# Patient Record
Sex: MALE | Race: WHITE | NOT HISPANIC OR LATINO | Employment: FULL TIME | URBAN - METROPOLITAN AREA
[De-identification: names, ages, dates, MRNs, and addresses within clinical notes are randomized per-mention and may not be internally consistent; named-entity substitution may affect disease eponyms.]

---

## 2024-02-16 ENCOUNTER — OFFICE VISIT (OUTPATIENT)
Dept: URGENT CARE | Age: 39
End: 2024-02-16
Payer: COMMERCIAL

## 2024-02-16 VITALS
RESPIRATION RATE: 18 BRPM | SYSTOLIC BLOOD PRESSURE: 182 MMHG | OXYGEN SATURATION: 98 % | TEMPERATURE: 98.5 F | DIASTOLIC BLOOD PRESSURE: 104 MMHG | HEART RATE: 98 BPM

## 2024-02-16 DIAGNOSIS — J02.9 SORE THROAT: Primary | ICD-10-CM

## 2024-02-16 LAB — S PYO AG THROAT QL: NEGATIVE

## 2024-02-16 PROCEDURE — 87070 CULTURE OTHR SPECIMN AEROBIC: CPT

## 2024-02-16 PROCEDURE — 87880 STREP A ASSAY W/OPTIC: CPT

## 2024-02-16 PROCEDURE — 99213 OFFICE O/P EST LOW 20 MIN: CPT

## 2024-02-16 RX ORDER — BROMPHENIRAMINE MALEATE, PSEUDOEPHEDRINE HYDROCHLORIDE, AND DEXTROMETHORPHAN HYDROBROMIDE 2; 30; 10 MG/5ML; MG/5ML; MG/5ML
5 SYRUP ORAL 4 TIMES DAILY PRN
Qty: 120 ML | Refills: 0 | Status: SHIPPED | OUTPATIENT
Start: 2024-02-16

## 2024-02-16 RX ORDER — FLUTICASONE PROPIONATE 50 MCG
1 SPRAY, SUSPENSION (ML) NASAL DAILY
Qty: 9.9 ML | Refills: 0 | Status: SHIPPED | OUTPATIENT
Start: 2024-02-16

## 2024-02-16 NOTE — PATIENT INSTRUCTIONS
Nearly all of upper respiratory infections in adults are the result of viruses. These viruses include COVID, flu, RSV, adenoviruses and other coronaviruses. Antibiotics do not treat viruses and are not recommended or indicated at this time.   Take over the counter medications as needed.   Recommend over the counter decongestants as needed and age appropriate.   Neti Pot rinses and saline nasal sprays may also help clear nasal and/or sinus congestion. Frequent suctioning (before/after naps and nighttime sleep) can help symptoms in infants and young children  Recommend Mucinex to assist in the break-up of chest congestion in adults. Recommend Zarbee's cold over the counter treatments for young children (under the age of 2).   Also may consider over the counter allergy medications such as Allegra-D and Zyrtec.   If symptoms persist for 7+ days, follow-up with primary care provider or return to clinic to discuss appropriateness of antibiotics.   Vaccination is important to help prevent the spread of disease and infants. Vaccines are available for COVID and influenza for ages 6 months and older. Please discuss scheduling vaccines with your PCP.    If symptoms worsen, shortness of breath develops, you experience severe sinus pain, difficulty swallowing or changes in voice, report to the emergency department.    RSV: When It's More Than Just a Cold - [email or web address]

## 2024-02-16 NOTE — PROGRESS NOTES
[facility]        NAME: Bear Richards is a 38 y.o. male  : 1985    MRN: [id number]  DATE: 2024  TIME: 9:06 AM    Assessment and Plan   Sore throat [J02.9]  1. Sore throat  POCT rapid strepA    Throat culture    brompheniramine-pseudoephedrine-DM 30-2-10 MG/5ML syrup    fluticasone (FLONASE) 50 mcg/act nasal spray            Patient Instructions       Follow up with PCP in 3-5 days.  Proceed to  ER if symptoms worsen.    Chief Complaint     Chief Complaint   Patient presents with   • Sore Throat   • Cold Like Symptoms   • Cough     Started on Tuesday with a sore throat then nasal congestion and today started with a cough         History of Present Illness       Patient is a 38-year-old male presenting with 3 days of postnasal drip, sore throat, productive cough with thick yellow mucus.  Patient denies fever or chills, no nausea vomiting or diarrhea, no headaches, chest pain or shortness of breath.  Patient concerned for strep.  States he took Tylenol and Robitussin with minimal relief.  Patient denies sick contacts     Sore Throat   Associated symptoms include coughing.   Cough  Associated symptoms include a sore throat.       Review of Systems   Review of Systems   HENT:  Positive for sore throat.    Respiratory:  Positive for cough.          Current Medications       Current Outpatient Medications:   •  brompheniramine-pseudoephedrine-DM 30-2-10 MG/5ML syrup, Take 5 mL by mouth 4 (four) times a day as needed for cough, Disp: 120 mL, Rfl: 0  •  fluticasone (FLONASE) 50 mcg/act nasal spray, 1 spray into each nostril daily, Disp: 9.9 mL, Rfl: 0    Current Allergies     Allergies as of 2024 - Reviewed 2024   Allergen Reaction Noted   • Ceclor [cefaclor] Hives 2024            The following portions of the patient's history were reviewed and updated as appropriate: allergies, current medications, past family history, past medical history, past social history, past surgical  history and problem list.     History reviewed. No pertinent past medical history.    History reviewed. No pertinent surgical history.    History reviewed. No pertinent family history.      Medications have been verified.        Objective   BP (!) 182/104 (BP Location: Right arm, Patient Position: Sitting, Cuff Size: Standard)   Pulse 98   Temp 98.5 °F (36.9 °C)   Resp 18   SpO2 98%   No LMP for male patient.       Physical Exam     Physical Exam  Vitals and nursing note reviewed.   Constitutional:       General: He is not in acute distress.     Appearance: He is well-developed. He is not ill-appearing.   HENT:      Right Ear: Tympanic membrane normal.      Left Ear: Tympanic membrane normal.      Nose: Congestion and rhinorrhea present.      Mouth/Throat:      Pharynx: Posterior oropharyngeal erythema present. No pharyngeal swelling, oropharyngeal exudate or uvula swelling.      Tonsils: 1+ on the right. 1+ on the left.   Cardiovascular:      Rate and Rhythm: Normal rate and regular rhythm.   Pulmonary:      Effort: Pulmonary effort is normal.      Breath sounds: Normal breath sounds.   Abdominal:      Palpations: Abdomen is soft.   Neurological:      Mental Status: He is alert.

## 2024-02-18 LAB — BACTERIA THROAT CULT: NORMAL
